# Patient Record
Sex: FEMALE | Race: WHITE
[De-identification: names, ages, dates, MRNs, and addresses within clinical notes are randomized per-mention and may not be internally consistent; named-entity substitution may affect disease eponyms.]

---

## 2019-02-19 ENCOUNTER — HOSPITAL ENCOUNTER (EMERGENCY)
Dept: HOSPITAL 76 - ED | Age: 29
Discharge: HOME | End: 2019-02-19
Payer: COMMERCIAL

## 2019-02-19 VITALS — SYSTOLIC BLOOD PRESSURE: 123 MMHG | DIASTOLIC BLOOD PRESSURE: 84 MMHG

## 2019-02-19 DIAGNOSIS — S20.219A: ICD-10-CM
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
## 2019-02-19 DIAGNOSIS — M79.671: ICD-10-CM

## 2019-02-19 DIAGNOSIS — S16.1XXA: Primary | ICD-10-CM
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
## 2019-02-19 DIAGNOSIS — M25.511: ICD-10-CM

## 2019-02-19 PROCEDURE — 99283 EMERGENCY DEPT VISIT LOW MDM: CPT

## 2019-02-19 PROCEDURE — 72125 CT NECK SPINE W/O DYE: CPT

## 2019-02-19 PROCEDURE — 71046 X-RAY EXAM CHEST 2 VIEWS: CPT
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
## 2019-03-22 ENCOUNTER — HOSPITAL ENCOUNTER (OUTPATIENT)
Dept: HOSPITAL 76 - DI | Age: 29
Discharge: HOME | End: 2019-03-22
Attending: FAMILY MEDICINE
Payer: COMMERCIAL

## 2019-03-22 DIAGNOSIS — R51: ICD-10-CM

## 2019-03-22 DIAGNOSIS — Z04.1: Primary | ICD-10-CM

## 2019-03-22 DIAGNOSIS — M50.222: ICD-10-CM

## 2019-03-22 DIAGNOSIS — M54.9: ICD-10-CM

## 2019-03-22 PROCEDURE — 72141 MRI NECK SPINE W/O DYE: CPT
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
